# Patient Record
Sex: MALE | Race: WHITE | Employment: OTHER | URBAN - METROPOLITAN AREA
[De-identification: names, ages, dates, MRNs, and addresses within clinical notes are randomized per-mention and may not be internally consistent; named-entity substitution may affect disease eponyms.]

---

## 2017-07-28 PROBLEM — I73.9 PVD (PERIPHERAL VASCULAR DISEASE) (HCC): Chronic | Status: ACTIVE | Noted: 2017-07-28

## 2017-07-28 PROBLEM — Q24.6: Chronic | Status: ACTIVE | Noted: 2017-07-28

## 2017-07-28 PROBLEM — Z95.0 S/P CARDIAC PACEMAKER PROCEDURE: Status: ACTIVE | Noted: 2017-07-28

## 2018-12-17 ENCOUNTER — OFFICE VISIT (OUTPATIENT)
Dept: VASCULAR SURGERY | Age: 68
End: 2018-12-17
Payer: MEDICARE

## 2018-12-17 VITALS
HEIGHT: 70 IN | SYSTOLIC BLOOD PRESSURE: 136 MMHG | HEART RATE: 82 BPM | BODY MASS INDEX: 27.2 KG/M2 | DIASTOLIC BLOOD PRESSURE: 87 MMHG | WEIGHT: 190 LBS

## 2018-12-17 DIAGNOSIS — I73.9 PVD (PERIPHERAL VASCULAR DISEASE) (HCC): Primary | Chronic | ICD-10-CM

## 2018-12-17 PROCEDURE — 99213 OFFICE O/P EST LOW 20 MIN: CPT | Performed by: SURGERY

## 2018-12-17 PROCEDURE — 1123F ACP DISCUSS/DSCN MKR DOCD: CPT | Performed by: SURGERY

## 2018-12-17 PROCEDURE — G8419 CALC BMI OUT NRM PARAM NOF/U: HCPCS | Performed by: SURGERY

## 2018-12-17 PROCEDURE — 4040F PNEUMOC VAC/ADMIN/RCVD: CPT | Performed by: SURGERY

## 2018-12-17 PROCEDURE — 1101F PT FALLS ASSESS-DOCD LE1/YR: CPT | Performed by: SURGERY

## 2018-12-17 PROCEDURE — 3017F COLORECTAL CA SCREEN DOC REV: CPT | Performed by: SURGERY

## 2018-12-17 PROCEDURE — G8427 DOCREV CUR MEDS BY ELIG CLIN: HCPCS | Performed by: SURGERY

## 2018-12-17 PROCEDURE — 1036F TOBACCO NON-USER: CPT | Performed by: SURGERY

## 2018-12-17 PROCEDURE — G8484 FLU IMMUNIZE NO ADMIN: HCPCS | Performed by: SURGERY

## 2018-12-17 RX ORDER — CILOSTAZOL 100 MG/1
TABLET ORAL
Qty: 60 TABLET | Refills: 5 | Status: SHIPPED | OUTPATIENT
Start: 2018-12-17 | End: 2018-12-17 | Stop reason: SDUPTHER

## 2018-12-17 RX ORDER — GABAPENTIN 100 MG/1
200 CAPSULE ORAL 3 TIMES DAILY
Qty: 270 CAPSULE | Refills: 3 | Status: SHIPPED | OUTPATIENT
Start: 2018-12-17 | End: 2019-03-17

## 2018-12-17 RX ORDER — CILOSTAZOL 100 MG/1
TABLET ORAL
Qty: 180 TABLET | Refills: 5 | Status: SHIPPED
Start: 2018-12-17 | End: 2020-02-24

## 2018-12-17 NOTE — PROGRESS NOTES
known allergies. Social History     Social History    Marital status:      Spouse name: N/A    Number of children: N/A    Years of education: N/A     Occupational History    Not on file.      Social History Main Topics    Smoking status: Former Smoker     Packs/day: 0.75     Types: Cigarettes     Quit date: 1/30/2017    Smokeless tobacco: Never Used    Alcohol use Yes      Comment: every other day    Drug use: Unknown    Sexual activity: Not on file     Other Topics Concern    Not on file     Social History Narrative    No narrative on file     Family history  Denies history of PVD, AAA     Labs  Lab Results   Component Value Date    WBC 8.1 07/29/2017    HGB 12.3 (L) 07/29/2017    HCT 33.9 (L) 07/29/2017     07/29/2017    PROTIME 11.1 07/28/2017    INR 1.0 07/28/2017    APTT 31.7 12/01/2010    K 3.9 07/29/2017    BUN 12 07/29/2017    CREATININE 0.8 07/29/2017     PHYSICAL EXAM:    CONSTITUTIONAL:   Awake, alert, cooperative  PSYCHIATRIC :  Oriented to time, place and person     Appropriate insight to disease process  EYES: Lids and lashes normal  ENT:  External ears and nose without lesions   Hearing deficits not noted  NECK: Supple, symmetrical, trachea midline   Thyroid goiter not appreciated   Carotid bruit not noted  Left pacemaker  LUNGS:  No increased work of breathing                 Scattered rhonchi  CARDIOVASCULAR:  regular rate and rhythm   ABDOMEN:  soft, non-distended, non-tender   Hernias umbilical, non tender   Aorta is not palpable  SKIN:   Normal skin color   Texture and turgor normal, no induration  EXTREMITIES:   R LE Edema absent   No open wounds  L LE Edema absent    No open wounds  R dorsalis pedis biphasic L dorsalis pedis biphasic   R posterior tibial weakly biphasic L posterior tibial weakly biphasic     A/P Neuropathy - painful of lower extremity  · Pt is having worsening of symptoms   · Increase neurontin dose to 200 mg TID  · We discussed Neurontin and the

## 2019-02-22 ENCOUNTER — HOSPITAL ENCOUNTER (OUTPATIENT)
Age: 69
Discharge: HOME | End: 2019-02-22
Payer: MEDICARE

## 2019-02-22 LAB
ALBUMIN SERPL-MCNC: 4.1 G/DL (ref 3.5–5.2)
ALP BLD-CCNC: 72 U/L (ref 40–129)
ALT SERPL-CCNC: 38 U/L (ref 0–40)
AMYLASE: 82 U/L (ref 20–100)
ANION GAP SERPL CALCULATED.3IONS-SCNC: 11 MMOL/L (ref 7–16)
AST SERPL-CCNC: 41 U/L (ref 0–39)
BASOPHILS ABSOLUTE: 0.11 E9/L (ref 0–0.2)
BASOPHILS RELATIVE PERCENT: 1.1 % (ref 0–2)
BILIRUB SERPL-MCNC: 1.2 MG/DL (ref 0–1.2)
BUN BLDV-MCNC: 9 MG/DL (ref 8–23)
CALCIUM SERPL-MCNC: 8.9 MG/DL (ref 8.6–10.2)
CHLORIDE BLD-SCNC: 94 MMOL/L (ref 98–107)
CO2: 28 MMOL/L (ref 22–29)
CREAT SERPL-MCNC: 0.7 MG/DL (ref 0.7–1.2)
EOSINOPHILS ABSOLUTE: 0.37 E9/L (ref 0.05–0.5)
EOSINOPHILS RELATIVE PERCENT: 3.9 % (ref 0–6)
GFR AFRICAN AMERICAN: >60
GFR NON-AFRICAN AMERICAN: >60 ML/MIN/1.73
GLUCOSE BLD-MCNC: 123 MG/DL (ref 74–99)
HCT VFR BLD CALC: 43.6 % (ref 37–54)
HEMOGLOBIN: 15.5 G/DL (ref 12.5–16.5)
IMMATURE GRANULOCYTES #: 0.05 E9/L
IMMATURE GRANULOCYTES %: 0.5 % (ref 0–5)
LIPASE: 32 U/L (ref 13–60)
LYMPHOCYTES ABSOLUTE: 2.3 E9/L (ref 1.5–4)
LYMPHOCYTES RELATIVE PERCENT: 24 % (ref 20–42)
MCH RBC QN AUTO: 36 PG (ref 26–35)
MCHC RBC AUTO-ENTMCNC: 35.6 % (ref 32–34.5)
MCV RBC AUTO: 101.2 FL (ref 80–99.9)
MONOCYTES ABSOLUTE: 1.18 E9/L (ref 0.1–0.95)
MONOCYTES RELATIVE PERCENT: 12.3 % (ref 2–12)
NEUTROPHILS ABSOLUTE: 5.56 E9/L (ref 1.8–7.3)
NEUTROPHILS RELATIVE PERCENT: 58.2 % (ref 43–80)
PDW BLD-RTO: 11.7 FL (ref 11.5–15)
PLATELET # BLD: 147 E9/L (ref 130–450)
PMV BLD AUTO: 8.5 FL (ref 7–12)
POTASSIUM SERPL-SCNC: 4.6 MMOL/L (ref 3.5–5)
RBC # BLD: 4.31 E12/L (ref 3.8–5.8)
SODIUM BLD-SCNC: 133 MMOL/L (ref 132–146)
T3 FREE: 2.5 PG/ML (ref 2–4.4)
T4 TOTAL: 12 MCG/DL (ref 4.5–11.7)
TOTAL PROTEIN: 7.5 G/DL (ref 6.4–8.3)
TSH SERPL DL<=0.05 MIU/L-ACNC: 2.05 UIU/ML (ref 0.27–4.2)
WBC # BLD: 9.6 E9/L (ref 4.5–11.5)

## 2019-02-22 PROCEDURE — 84436 ASSAY OF TOTAL THYROXINE: CPT

## 2019-02-22 PROCEDURE — 36415 COLL VENOUS BLD VENIPUNCTURE: CPT

## 2019-02-22 PROCEDURE — 84443 ASSAY THYROID STIM HORMONE: CPT

## 2019-02-22 PROCEDURE — 85025 COMPLETE CBC W/AUTO DIFF WBC: CPT

## 2019-02-22 PROCEDURE — 82150 ASSAY OF AMYLASE: CPT

## 2019-02-22 PROCEDURE — 83690 ASSAY OF LIPASE: CPT

## 2019-02-22 PROCEDURE — 84481 FREE ASSAY (FT-3): CPT

## 2019-02-22 PROCEDURE — 80053 COMPREHEN METABOLIC PANEL: CPT

## 2020-02-25 RX ORDER — CILOSTAZOL 100 MG/1
TABLET ORAL
Qty: 180 TABLET | Refills: 0 | Status: SHIPPED | OUTPATIENT
Start: 2020-02-25

## 2022-10-26 ENCOUNTER — NURSE ONLY (OUTPATIENT)
Dept: NON INVASIVE DIAGNOSTICS | Age: 72
End: 2022-10-26

## 2023-02-07 ENCOUNTER — TELEPHONE (OUTPATIENT)
Dept: NON INVASIVE DIAGNOSTICS | Age: 73
End: 2023-02-07

## 2023-02-07 NOTE — TELEPHONE ENCOUNTER
----- Message from Gregor Roque RN sent at 2/7/2023  8:43 AM EST -----  Needs 3 month device clinic [facility]  Thanks

## 2023-05-25 ENCOUNTER — NURSE ONLY (OUTPATIENT)
Dept: NON INVASIVE DIAGNOSTICS | Age: 73
End: 2023-05-25
Payer: MEDICARE

## 2023-05-25 DIAGNOSIS — Z95.0 S/P CARDIAC PACEMAKER PROCEDURE: ICD-10-CM

## 2023-05-25 DIAGNOSIS — Q24.6 HEART BLOCK, CONGENITAL: Primary | Chronic | ICD-10-CM

## 2023-05-25 PROCEDURE — 93280 PM DEVICE PROGR EVAL DUAL: CPT | Performed by: INTERNAL MEDICINE

## 2023-12-14 ENCOUNTER — NURSE ONLY (OUTPATIENT)
Dept: NON INVASIVE DIAGNOSTICS | Age: 73
End: 2023-12-14

## 2023-12-14 DIAGNOSIS — Z95.0 CARDIAC PACEMAKER IN SITU: ICD-10-CM

## 2023-12-14 DIAGNOSIS — I44.2 COMPLETE HEART BLOCK (HCC): Primary | ICD-10-CM

## 2024-05-13 PROCEDURE — 93294 REM INTERROG EVL PM/LDLS PM: CPT | Performed by: INTERNAL MEDICINE

## 2024-05-13 PROCEDURE — 93296 REM INTERROG EVL PM/IDS: CPT | Performed by: INTERNAL MEDICINE

## 2024-05-29 ENCOUNTER — HOSPITAL ENCOUNTER (OUTPATIENT)
Dept: CARDIOLOGY | Age: 74
Discharge: HOME | End: 2024-05-31
Attending: INTERNAL MEDICINE
Payer: MEDICARE

## 2024-05-29 VITALS
HEIGHT: 70 IN | DIASTOLIC BLOOD PRESSURE: 60 MMHG | BODY MASS INDEX: 27.92 KG/M2 | SYSTOLIC BLOOD PRESSURE: 136 MMHG | WEIGHT: 195 LBS

## 2024-05-29 DIAGNOSIS — I44.2 COMPLETE HEART BLOCK (HCC): ICD-10-CM

## 2024-05-29 LAB
ECHO AV AREA PEAK VELOCITY: 3 CM2
ECHO AV AREA VTI: 3.6 CM2
ECHO AV AREA/BSA PEAK VELOCITY: 1.5 CM2/M2
ECHO AV AREA/BSA VTI: 1.7 CM2/M2
ECHO AV CUSP MM: 1.7 CM
ECHO AV MEAN GRADIENT: 3 MMHG
ECHO AV MEAN VELOCITY: 0.8 M/S
ECHO AV PEAK GRADIENT: 5 MMHG
ECHO AV PEAK VELOCITY: 1.2 M/S
ECHO AV VELOCITY RATIO: 0.75
ECHO AV VTI: 15.3 CM
ECHO BSA: 2.09 M2
ECHO EST RA PRESSURE: 3 MMHG
ECHO LA DIAMETER INDEX: 1.55 CM/M2
ECHO LA DIAMETER: 3.2 CM
ECHO LA VOL A-L A2C: 41 ML (ref 18–58)
ECHO LA VOL A-L A4C: 27 ML (ref 18–58)
ECHO LA VOL MOD A2C: 39 ML (ref 18–58)
ECHO LA VOL MOD A4C: 24 ML (ref 18–58)
ECHO LA VOLUME AREA LENGTH: 36 ML
ECHO LA VOLUME INDEX A-L A2C: 20 ML/M2 (ref 16–34)
ECHO LA VOLUME INDEX A-L A4C: 13 ML/M2 (ref 16–34)
ECHO LA VOLUME INDEX AREA LENGTH: 17 ML/M2 (ref 16–34)
ECHO LA VOLUME INDEX MOD A2C: 19 ML/M2 (ref 16–34)
ECHO LA VOLUME INDEX MOD A4C: 12 ML/M2 (ref 16–34)
ECHO LV FRACTIONAL SHORTENING: 30 % (ref 28–44)
ECHO LV INTERNAL DIMENSION DIASTOLE INDEX: 1.46 CM/M2
ECHO LV INTERNAL DIMENSION DIASTOLIC: 3 CM (ref 4.2–5.9)
ECHO LV INTERNAL DIMENSION SYSTOLIC INDEX: 1.02 CM/M2
ECHO LV INTERNAL DIMENSION SYSTOLIC: 2.1 CM
ECHO LV IVSD: 1.2 CM (ref 0.6–1)
ECHO LV MASS 2D: 109.1 G (ref 88–224)
ECHO LV MASS INDEX 2D: 53 G/M2 (ref 49–115)
ECHO LV POSTERIOR WALL DIASTOLIC: 1.2 CM (ref 0.6–1)
ECHO LV RELATIVE WALL THICKNESS RATIO: 0.8
ECHO LVOT AREA: 3.8 CM2
ECHO LVOT AV VTI INDEX: 0.98
ECHO LVOT DIAM: 2.2 CM
ECHO LVOT MEAN GRADIENT: 2 MMHG
ECHO LVOT PEAK GRADIENT: 4 MMHG
ECHO LVOT PEAK VELOCITY: 0.9 M/S
ECHO LVOT STROKE VOLUME INDEX: 27.7 ML/M2
ECHO LVOT SV: 57 ML
ECHO LVOT VTI: 15 CM
ECHO MV A VELOCITY: 0.72 M/S
ECHO MV AREA PHT: 3.8 CM2
ECHO MV AREA VTI: 3.4 CM2
ECHO MV E DECELERATION TIME (DT): 228.7 MS
ECHO MV E VELOCITY: 0.42 M/S
ECHO MV E/A RATIO: 0.58
ECHO MV LVOT VTI INDEX: 1.11
ECHO MV MAX VELOCITY: 0.9 M/S
ECHO MV MEAN GRADIENT: 1 MMHG
ECHO MV MEAN VELOCITY: 0.5 M/S
ECHO MV PEAK GRADIENT: 3 MMHG
ECHO MV PRESSURE HALF TIME (PHT): 57.8 MS
ECHO MV VTI: 16.7 CM
ECHO PV MAX VELOCITY: 0.8 M/S
ECHO PV MEAN GRADIENT: 1 MMHG
ECHO PV MEAN VELOCITY: 0.5 M/S
ECHO PV PEAK GRADIENT: 3 MMHG
ECHO PV VTI: 9.7 CM
ECHO RIGHT VENTRICULAR SYSTOLIC PRESSURE (RVSP): 26 MMHG
ECHO RV INTERNAL DIMENSION: 2.8 CM
ECHO TV REGURGITANT MAX VELOCITY: 2.41 M/S
ECHO TV REGURGITANT PEAK GRADIENT: 23 MMHG

## 2024-05-29 PROCEDURE — 93306 TTE W/DOPPLER COMPLETE: CPT | Performed by: INTERNAL MEDICINE

## 2024-05-29 PROCEDURE — 93306 TTE W/DOPPLER COMPLETE: CPT

## 2024-06-11 ENCOUNTER — OFFICE VISIT (OUTPATIENT)
Dept: NON INVASIVE DIAGNOSTICS | Age: 74
End: 2024-06-11

## 2024-06-11 ENCOUNTER — NURSE ONLY (OUTPATIENT)
Dept: NON INVASIVE DIAGNOSTICS | Age: 74
End: 2024-06-11

## 2024-06-11 VITALS
HEIGHT: 70 IN | WEIGHT: 190 LBS | SYSTOLIC BLOOD PRESSURE: 112 MMHG | DIASTOLIC BLOOD PRESSURE: 70 MMHG | HEART RATE: 78 BPM | BODY MASS INDEX: 27.2 KG/M2 | RESPIRATION RATE: 12 BRPM

## 2024-06-11 DIAGNOSIS — I44.2 COMPLETE HEART BLOCK (HCC): Primary | ICD-10-CM

## 2024-06-11 DIAGNOSIS — Q24.6 HEART BLOCK, CONGENITAL: Primary | Chronic | ICD-10-CM

## 2024-06-11 DIAGNOSIS — Z95.0 CARDIAC PACEMAKER IN SITU: ICD-10-CM

## 2024-06-11 NOTE — PROGRESS NOTES
V  100%    Battery longevity--4.5  months  Arrhythmias--nonsustained atrial tachycardia      Evaluation and reprogramming included   Overall device function is normal  All  device programmable settings were evaluated per above, along with iterative adjustments (capture thresholds) to assess and select the most appropriate final programming for consistent delivery off the appropriate therapy and to verify function of the device.    I have independently reviewed all of the ECGs and rhythm strips per above    I have personally reviewed the laboratory, cardiac diagnostic and radiographic testing as outlined above:        Impression:     1.  Dual-chamber pacemaker in situ--Brian, MELISSA 2017  Appropriate function but device generator is reaching ARIA    2.   Complete heart block--patient is pacemaker dependent  No recent cardiac workup however.  Last echo was in 2017    3.  Peripheral vascular disease--patient complains of intermittent claudication and is scheduled to be seen by vascular surgery in the near future    4.  Hypertension  Patient currently on carvedilol and losartan    5.  Ongoing smoking habit    Recommendations:    1.  Monthly remotes  2.  Device generator change when it reaches ARIA.  Patient will need a temporary pacemaker at the time of generator change  3.  Follow-up in device clinic check in 4 months if it has not reached ARIA  4.  No change in medical therapy  Risks, benefits, and alternatives of pacemaker generator replacement were discussed in detail today. These risks include but are not limited to bleeding,infection, lead damage or discovery of a non-functional lead which would require lead revision and risks of blood clot, pneumothorax, hemothorax, cardiac perforation and tamponade, lead dislodgement, contrast induced nephropathy leading to short or even long term dialysis, vascular injury requiring emergent surgical repair, stroke and even death. The patient understands these risks and agrees

## 2024-10-10 ENCOUNTER — NURSE ONLY (OUTPATIENT)
Dept: NON INVASIVE DIAGNOSTICS | Age: 74
End: 2024-10-10

## 2024-10-10 ENCOUNTER — TELEPHONE (OUTPATIENT)
Dept: NON INVASIVE DIAGNOSTICS | Age: 74
End: 2024-10-10

## 2024-10-10 ENCOUNTER — PREP FOR PROCEDURE (OUTPATIENT)
Dept: NON INVASIVE DIAGNOSTICS | Age: 74
End: 2024-10-10

## 2024-10-10 DIAGNOSIS — Z95.0 S/P CARDIAC PACEMAKER PROCEDURE: ICD-10-CM

## 2024-10-10 DIAGNOSIS — Q24.6 HEART BLOCK, CONGENITAL: Primary | Chronic | ICD-10-CM

## 2024-10-10 RX ORDER — SODIUM CHLORIDE 9 MG/ML
INJECTION, SOLUTION INTRAVENOUS PRN
Status: CANCELLED | OUTPATIENT
Start: 2024-10-31

## 2024-10-10 RX ORDER — SODIUM CHLORIDE 0.9 % (FLUSH) 0.9 %
5-40 SYRINGE (ML) INJECTION EVERY 12 HOURS SCHEDULED
Status: CANCELLED | OUTPATIENT
Start: 2024-10-31

## 2024-10-10 RX ORDER — SODIUM CHLORIDE 0.9 % (FLUSH) 0.9 %
5-40 SYRINGE (ML) INJECTION PRN
Status: CANCELLED | OUTPATIENT
Start: 2024-10-31

## 2024-10-10 NOTE — TELEPHONE ENCOUNTER
Patient is scheduled for a Dual Pacemaker Gen Change with temp pacing [facility] on 10/31/2024 with Dr. Myers.    Pre-op instructions given to patient at the time of his device check. Patient doesn't have to hold any medications for this procedure. Patient voiced understanding.    2 week wound check scheduled for 11/13/2024 at 10:00am in [facility].

## 2024-10-10 NOTE — TELEPHONE ENCOUNTER
Patient is scheduled for a Dual pacemaker Gen Change on 10/31/2024 with Dr. Myers.    CPT: 38710  ICD: Z45.010- Battery Depletion          I44.2- Complete Heart Block    INS: [employer] [place] [employer] id: [id number]

## 2024-10-30 ENCOUNTER — TELEPHONE (OUTPATIENT)
Age: 74
End: 2024-10-30

## 2024-10-31 ENCOUNTER — ANESTHESIA EVENT (OUTPATIENT)
Age: 74
End: 2024-10-31
Payer: MEDICARE

## 2024-10-31 ENCOUNTER — HOSPITAL ENCOUNTER (OUTPATIENT)
Age: 74
Setting detail: OUTPATIENT SURGERY
Discharge: HOME | End: 2024-10-31
Attending: INTERNAL MEDICINE | Admitting: INTERNAL MEDICINE
Payer: MEDICARE

## 2024-10-31 ENCOUNTER — ANESTHESIA (OUTPATIENT)
Age: 74
End: 2024-10-31
Payer: MEDICARE

## 2024-10-31 VITALS
WEIGHT: 190 LBS | OXYGEN SATURATION: 96 % | DIASTOLIC BLOOD PRESSURE: 94 MMHG | SYSTOLIC BLOOD PRESSURE: 160 MMHG | RESPIRATION RATE: 18 BRPM | HEART RATE: 72 BPM | TEMPERATURE: 98 F | HEIGHT: 70 IN | BODY MASS INDEX: 27.2 KG/M2

## 2024-10-31 DIAGNOSIS — Z45.010 ENCOUNTER FOR PACEMAKER AT END OF BATTERY LIFE: ICD-10-CM

## 2024-10-31 LAB
ALBUMIN SERPL-MCNC: 4.2 G/DL (ref 3.5–5.2)
ALP SERPL-CCNC: 101 U/L (ref 40–129)
ALT SERPL-CCNC: 31 U/L (ref 0–40)
ANION GAP SERPL CALCULATED.3IONS-SCNC: 7 MMOL/L (ref 7–16)
AST SERPL-CCNC: 41 U/L (ref 0–39)
BASOPHILS # BLD: 0.13 K/UL (ref 0–0.2)
BASOPHILS NFR BLD: 2 % (ref 0–2)
BILIRUB SERPL-MCNC: 1.4 MG/DL (ref 0–1.2)
BUN SERPL-MCNC: 12 MG/DL (ref 6–23)
CALCIUM SERPL-MCNC: 9.1 MG/DL (ref 8.6–10.2)
CHLORIDE SERPL-SCNC: 97 MMOL/L (ref 98–107)
CO2 SERPL-SCNC: 30 MMOL/L (ref 22–29)
CREAT SERPL-MCNC: 0.6 MG/DL (ref 0.7–1.2)
ECHO BSA: 2.06 M2
EOSINOPHIL # BLD: 0.65 K/UL (ref 0.05–0.5)
EOSINOPHILS RELATIVE PERCENT: 8 % (ref 0–6)
ERYTHROCYTE [DISTWIDTH] IN BLOOD BY AUTOMATED COUNT: 12.2 % (ref 11.5–15)
GFR, ESTIMATED: >90 ML/MIN/1.73M2
GLUCOSE SERPL-MCNC: 126 MG/DL (ref 74–99)
HCT VFR BLD AUTO: 41.6 % (ref 37–54)
HGB BLD-MCNC: 14.5 G/DL (ref 12.5–16.5)
IMM GRANULOCYTES # BLD AUTO: 0.07 K/UL (ref 0–0.58)
IMM GRANULOCYTES NFR BLD: 1 % (ref 0–5)
LYMPHOCYTES NFR BLD: 2.18 K/UL (ref 1.5–4)
LYMPHOCYTES RELATIVE PERCENT: 26 % (ref 20–42)
MAGNESIUM SERPL-MCNC: 1.9 MG/DL (ref 1.6–2.6)
MCH RBC QN AUTO: 35.8 PG (ref 26–35)
MCHC RBC AUTO-ENTMCNC: 34.9 G/DL (ref 32–34.5)
MCV RBC AUTO: 102.7 FL (ref 80–99.9)
MONOCYTES NFR BLD: 1.17 K/UL (ref 0.1–0.95)
MONOCYTES NFR BLD: 14 % (ref 2–12)
NEUTROPHILS NFR BLD: 49 % (ref 43–80)
NEUTS SEG NFR BLD: 4.05 K/UL (ref 1.8–7.3)
PLATELET # BLD AUTO: 118 K/UL (ref 130–450)
PMV BLD AUTO: 8.5 FL (ref 7–12)
POTASSIUM SERPL-SCNC: 4.9 MMOL/L (ref 3.5–5)
PROT SERPL-MCNC: 7.9 G/DL (ref 6.4–8.3)
RBC # BLD AUTO: 4.05 M/UL (ref 3.8–5.8)
SODIUM SERPL-SCNC: 134 MMOL/L (ref 132–146)
TSH SERPL DL<=0.05 MIU/L-ACNC: 2.41 UIU/ML (ref 0.27–4.2)
WBC OTHER # BLD: 8.3 K/UL (ref 4.5–11.5)

## 2024-10-31 PROCEDURE — 3700000001 HC ADD 15 MINUTES (ANESTHESIA): Performed by: INTERNAL MEDICINE

## 2024-10-31 PROCEDURE — 80053 COMPREHEN METABOLIC PANEL: CPT

## 2024-10-31 PROCEDURE — 33228 REMV&REPLC PM GEN DUAL LEAD: CPT | Performed by: INTERNAL MEDICINE

## 2024-10-31 PROCEDURE — 2580000003 HC RX 258

## 2024-10-31 PROCEDURE — C1889 IMPLANT/INSERT DEVICE, NOC: HCPCS | Performed by: INTERNAL MEDICINE

## 2024-10-31 PROCEDURE — 2580000003 HC RX 258: Performed by: NURSE ANESTHETIST, CERTIFIED REGISTERED

## 2024-10-31 PROCEDURE — 3700000000 HC ANESTHESIA ATTENDED CARE: Performed by: INTERNAL MEDICINE

## 2024-10-31 PROCEDURE — 83735 ASSAY OF MAGNESIUM: CPT

## 2024-10-31 PROCEDURE — 85025 COMPLETE CBC W/AUTO DIFF WBC: CPT

## 2024-10-31 PROCEDURE — 2720000010 HC SURG SUPPLY STERILE: Performed by: INTERNAL MEDICINE

## 2024-10-31 PROCEDURE — 84443 ASSAY THYROID STIM HORMONE: CPT

## 2024-10-31 PROCEDURE — C1785 PMKR, DUAL, RATE-RESP: HCPCS | Performed by: INTERNAL MEDICINE

## 2024-10-31 PROCEDURE — 2500000003 HC RX 250 WO HCPCS: Performed by: INTERNAL MEDICINE

## 2024-10-31 PROCEDURE — 6360000002 HC RX W HCPCS: Performed by: NURSE ANESTHETIST, CERTIFIED REGISTERED

## 2024-10-31 PROCEDURE — 7100000011 HC PHASE II RECOVERY - ADDTL 15 MIN: Performed by: INTERNAL MEDICINE

## 2024-10-31 PROCEDURE — C1894 INTRO/SHEATH, NON-LASER: HCPCS | Performed by: INTERNAL MEDICINE

## 2024-10-31 PROCEDURE — 7100000010 HC PHASE II RECOVERY - FIRST 15 MIN: Performed by: INTERNAL MEDICINE

## 2024-10-31 PROCEDURE — 2709999900 HC NON-CHARGEABLE SUPPLY: Performed by: INTERNAL MEDICINE

## 2024-10-31 DEVICE — DISPOSABLE SURGICAL CABLE
Type: IMPLANTABLE DEVICE | Status: NON-FUNCTIONAL
Brand: SJM™
Removed: 2024-10-31

## 2024-10-31 DEVICE — ENVELOPE CMRM6122 ABSORB MED MR
Type: IMPLANTABLE DEVICE | Site: CHEST  WALL | Status: FUNCTIONAL
Brand: TYRX™

## 2024-10-31 DEVICE — DR PACEMAKER DDDR
Type: IMPLANTABLE DEVICE | Site: CHEST  WALL | Status: FUNCTIONAL
Brand: ASSURITY MRI™

## 2024-10-31 RX ORDER — MIDAZOLAM HYDROCHLORIDE 1 MG/ML
INJECTION, SOLUTION INTRAMUSCULAR; INTRAVENOUS
Status: DISCONTINUED | OUTPATIENT
Start: 2024-10-31 | End: 2024-10-31 | Stop reason: SDUPTHER

## 2024-10-31 RX ORDER — SODIUM CHLORIDE 9 MG/ML
INJECTION, SOLUTION INTRAVENOUS
Status: DISCONTINUED | OUTPATIENT
Start: 2024-10-31 | End: 2024-10-31 | Stop reason: SDUPTHER

## 2024-10-31 RX ORDER — DOXYCYCLINE HYCLATE 100 MG
100 TABLET ORAL 2 TIMES DAILY
Qty: 14 TABLET | Refills: 0 | Status: SHIPPED | OUTPATIENT
Start: 2024-10-31 | End: 2024-11-07

## 2024-10-31 RX ORDER — PHENYLEPHRINE HYDROCHLORIDE 10 MG/ML
INJECTION INTRAVENOUS
Status: DISCONTINUED | OUTPATIENT
Start: 2024-10-31 | End: 2024-10-31 | Stop reason: SDUPTHER

## 2024-10-31 RX ORDER — LIDOCAINE HYDROCHLORIDE 10 MG/ML
INJECTION, SOLUTION INFILTRATION; PERINEURAL PRN
Status: DISCONTINUED | OUTPATIENT
Start: 2024-10-31 | End: 2024-10-31 | Stop reason: HOSPADM

## 2024-10-31 RX ORDER — CEFAZOLIN SODIUM 1 G/3ML
INJECTION, POWDER, FOR SOLUTION INTRAMUSCULAR; INTRAVENOUS
Status: DISCONTINUED | OUTPATIENT
Start: 2024-10-31 | End: 2024-10-31 | Stop reason: SDUPTHER

## 2024-10-31 RX ORDER — FENTANYL CITRATE 50 UG/ML
INJECTION, SOLUTION INTRAMUSCULAR; INTRAVENOUS
Status: DISCONTINUED | OUTPATIENT
Start: 2024-10-31 | End: 2024-10-31 | Stop reason: SDUPTHER

## 2024-10-31 RX ORDER — VANCOMYCIN HYDROCHLORIDE 1 G/20ML
INJECTION, POWDER, LYOPHILIZED, FOR SOLUTION INTRAVENOUS
Status: DISCONTINUED | OUTPATIENT
Start: 2024-10-31 | End: 2024-10-31 | Stop reason: SDUPTHER

## 2024-10-31 RX ORDER — SODIUM CHLORIDE 9 MG/ML
INJECTION, SOLUTION INTRAVENOUS PRN
Status: DISCONTINUED | OUTPATIENT
Start: 2024-10-31 | End: 2024-10-31 | Stop reason: HOSPADM

## 2024-10-31 RX ORDER — PROPOFOL 10 MG/ML
INJECTION, EMULSION INTRAVENOUS
Status: DISCONTINUED | OUTPATIENT
Start: 2024-10-31 | End: 2024-10-31 | Stop reason: SDUPTHER

## 2024-10-31 RX ORDER — SODIUM CHLORIDE 0.9 % (FLUSH) 0.9 %
5-40 SYRINGE (ML) INJECTION PRN
Status: DISCONTINUED | OUTPATIENT
Start: 2024-10-31 | End: 2024-10-31 | Stop reason: HOSPADM

## 2024-10-31 RX ORDER — SODIUM CHLORIDE 0.9 % (FLUSH) 0.9 %
5-40 SYRINGE (ML) INJECTION EVERY 12 HOURS SCHEDULED
Status: DISCONTINUED | OUTPATIENT
Start: 2024-10-31 | End: 2024-10-31 | Stop reason: HOSPADM

## 2024-10-31 RX ADMIN — MIDAZOLAM 2 MG: 1 INJECTION INTRAMUSCULAR; INTRAVENOUS at 12:27

## 2024-10-31 RX ADMIN — CEFAZOLIN 2 G: 1 INJECTION, POWDER, FOR SOLUTION INTRAMUSCULAR; INTRAVENOUS at 12:37

## 2024-10-31 RX ADMIN — VANCOMYCIN HYDROCHLORIDE 1000 MG: 1 INJECTION, POWDER, LYOPHILIZED, FOR SOLUTION INTRAVENOUS at 12:41

## 2024-10-31 RX ADMIN — PROPOFOL 30 MG: 10 INJECTION, EMULSION INTRAVENOUS at 12:35

## 2024-10-31 RX ADMIN — SODIUM CHLORIDE: 9 INJECTION, SOLUTION INTRAVENOUS at 12:27

## 2024-10-31 RX ADMIN — PHENYLEPHRINE HYDROCHLORIDE 200 MCG: 10 INJECTION, SOLUTION INTRAVENOUS at 12:58

## 2024-10-31 RX ADMIN — FENTANYL CITRATE 50 MCG: 50 INJECTION, SOLUTION INTRAMUSCULAR; INTRAVENOUS at 13:08

## 2024-10-31 RX ADMIN — PHENYLEPHRINE HYDROCHLORIDE 200 MCG: 10 INJECTION, SOLUTION INTRAVENOUS at 13:02

## 2024-10-31 RX ADMIN — PHENYLEPHRINE HYDROCHLORIDE 100 MCG: 10 INJECTION, SOLUTION INTRAVENOUS at 12:56

## 2024-10-31 RX ADMIN — PROPOFOL 50 MCG/KG/MIN: 10 INJECTION, EMULSION INTRAVENOUS at 12:36

## 2024-10-31 RX ADMIN — SODIUM CHLORIDE: 9 INJECTION, SOLUTION INTRAVENOUS at 12:41

## 2024-10-31 RX ADMIN — FENTANYL CITRATE 50 MCG: 50 INJECTION, SOLUTION INTRAMUSCULAR; INTRAVENOUS at 12:39

## 2024-10-31 RX ADMIN — PHENYLEPHRINE HYDROCHLORIDE 200 MCG: 10 INJECTION, SOLUTION INTRAVENOUS at 13:04

## 2024-10-31 RX ADMIN — FENTANYL CITRATE 50 MCG: 50 INJECTION, SOLUTION INTRAMUSCULAR; INTRAVENOUS at 12:35

## 2024-10-31 ASSESSMENT — PAIN - FUNCTIONAL ASSESSMENT: PAIN_FUNCTIONAL_ASSESSMENT: NONE - DENIES PAIN

## 2024-10-31 NOTE — PROGRESS NOTES
Left upper chest with Aquacel intact, site soft, no bleeding or hematoma. Right femoral vein site soft, no bleeding or hematoma, DSD and opsite intact. Pt assisted to bathroom x 1 assist.  Gait steady.

## 2024-10-31 NOTE — PROGRESS NOTES
Discharge instructions given to pt and family.  Both Verbalized an understanding. Questions answered.

## 2024-10-31 NOTE — DISCHARGE INSTRUCTIONS
[facility] Pacemaker Generator Change Patient Discharge Instructions      Medications:  Please resume your normal medication regimen as you are currently taking.  A prescription for an antibiotic has been sent to your pharmacy.    Follow-Up: You will be seen on Wednesday 11/13/24 at 10:30 am at the [facility]  for an incision check and brief pacemaker evaluation.      Incision Care:   You may have a white pressure dressing over your incision. If you do, remove it in 24 hours.  Aquacel dressing: Please leave the brown Aquacel dressing on for 7 days (under white pressure bandage if you have one). Remove the AquaCel dressing on Thursday 11/7/24.  Steri strips: They look like white strips of tape over your incision (under brown aquacel dressing). DO NOT REMOVE the steri strips, they will either fall off on their own or be removed at your follow up appointment.  Check the incision everyday: If you find any redness, warmth, swelling, drainage, fever greater than 100 degrees,  or chills notify the office immediately at [phone].  No lotions, powders, or creams to incision.    Bathing: You may shower starting tomorrow just make sure no water runs directly over the incision for 7 days (once the dressing has been removed). No submerging the incision in water (bath, swimming pool, hot tub..) until wound is completely healed.    Activity: You may continue regular daily activities tomorrow using caution for 1 week with the arm closest to your pacemaker. No extreme movements or stretching. No blows to the site or seatbelt/straps rubbing, cushion for your comfort.     ID Card: You will have a temporary ID card until a permanent card is sent to you by the device company. The permanent card will look like a ’s license or credit card and should arrive within 8 weeks. Carry your ID card with you at all times.     [facility]  [address] [facility]

## 2024-10-31 NOTE — ANESTHESIA PRE PROCEDURE
10:27 AM    GFRAA >60 02/22/2019 10:27 AM    LABGLOM >60 02/22/2019 10:27 AM    GLUCOSE 123 02/22/2019 10:27 AM    GLUCOSE 111 11/30/2011 11:15 AM    CALCIUM 8.9 02/22/2019 10:27 AM    BILITOT 1.2 02/22/2019 10:27 AM    ALKPHOS 72 02/22/2019 10:27 AM    AST 41 02/22/2019 10:27 AM    ALT 38 02/22/2019 10:27 AM       POC Tests: No results for input(s): \"POCGLU\", \"POCNA\", \"POCK\", \"POCCL\", \"POCBUN\", \"POCHEMO\", \"POCHCT\" in the last 72 hours.    Coags:   Lab Results   Component Value Date/Time    PROTIME 11.1 07/28/2017 09:50 AM    PROTIME 11.7 12/02/2010 05:15 AM    INR 1.0 07/28/2017 09:50 AM    APTT 31.7 12/01/2010 06:50 PM       HCG (If Applicable): No results found for: \"PREGTESTUR\", \"PREGSERUM\", \"HCG\", \"HCGQUANT\"     ABGs: No results found for: \"PHART\", \"PO2ART\", \"XDN0TPN\", \"SPO7CUB\", \"BEART\", \"T8BXXCQJ\"     Type & Screen (If Applicable):  No results found for: \"ABORH\", \"LABANTI\"    Drug/Infectious Status (If Applicable):  No results found for: \"HIV\", \"HEPCAB\"    COVID-19 Screening (If Applicable): No results found for: \"COVID19\"        Anesthesia Evaluation  Patient summary reviewed and Nursing notes reviewed   no history of anesthetic complications:   Airway: Mallampati: III  TM distance: <3 FB   Neck ROM: full  Mouth opening: > = 3 FB   Dental:      Comment: Overbite noted    Pulmonary:Negative Pulmonary ROS and normal exam  breath sounds clear to auscultation                             Cardiovascular:  Exercise tolerance: good (>4 METS)  (+) hypertension:, pacemaker (generator EOL): pacemaker      ECG reviewed  Rhythm: regular  Rate: normal      Cleared by cardiology              Neuro/Psych:   Negative Neuro/Psych ROS              GI/Hepatic/Renal: Neg GI/Hepatic/Renal ROS            Endo/Other: Negative Endo/Other ROS             Pt had no PAT visit       Abdominal: normal exam      Abdomen: soft.      Vascular: negative vascular ROS.         Other Findings:           Anesthesia Plan      MAC     ASA 3

## 2024-10-31 NOTE — H&P
Cardiac Electrophysiology   History and physical  Rafael Moreno  1950  Date of Service: 10/31/2024  Referring Provider/PCP: Drake Ely MD  Chief Complaint:   Patient here for pacemaker generator change        Patient Active Problem List    Diagnosis Date Noted    Encounter for pacemaker at end of battery life 10/31/2024    S/P cardiac pacemaker procedure 07/28/2017    Heart block, congenital 07/28/2017    PVD (peripheral vascular disease) ([facility]) 07/28/2017     PMH    1.  Congenital heart block.  2.  S/P pacemaker implantation on 7/13/00. The pacemaker is Pacesetter, model 5342.  3.  Echocardiogram on 7/13/00, which was completely normal except for mild concentric left ventricular hypertrophy.  4.  History of hypertension.  5.  Pacemaker generator change to [employer] RF SN [id number] on 12/2/10.  6.  Most recent pacemaker generator change was  in 2017    Current Facility-Administered Medications   Medication Dose Route Frequency Provider Last Rate Last Admin    sodium chloride flush 0.9 % injection 5-40 mL  5-40 mL IntraVENous 2 times per day Jarome, Tita        sodium chloride flush 0.9 % injection 5-40 mL  5-40 mL IntraVENous PRN Jarome, Tita        0.9 % sodium chloride infusion   IntraVENous PRN Jarome, Tita   Stopped at 10/31/24 1339         No Known Allergies    SUBJECTIVE: Rafael Moreno, 73-year-old patient with a history of congenital complete heart block and a dual-chamber pacemaker in situ since year 2000 presents to the office today for follow up.  He denies any new cardiac symptoms at the present time.  No recent hospitalizations.  He however has considerable symptoms related to peripheral vascular disease and is scheduled to be seen by Dr. MERCADO in the near future  Still smoking      6/11/24: Patient denies any new cardiac symptoms.  Still smokes few cigarettes every few days.  Has considerable symptoms related to peripheral vascular disease and is awaiting  reviewing records/tests, counseling/education of the patient, ordering medications/tests/procedures, coordinating care, and documenting clinical information in the EHR.   Thank you for allowing me to participate in your patient's care.    Annamaria Myers MD, [facility]  Cardiac Electrophysiology  [facility]  [facility]: [place]

## 2024-10-31 NOTE — ANESTHESIA POSTPROCEDURE EVALUATION
Department of Anesthesiology  Postprocedure Note    Patient: Rafael Moreno  MRN: [id number]  YOB: 1950  Date of evaluation: 10/31/2024    Procedure Summary       Date: 10/31/24 Room / [place] [facility]    Anesthesia Start: 1219 Anesthesia Stop: 1340    Procedure: Remove & replace PPM gen dual lead Diagnosis:       Encounter for pacemaker at end of battery life      (Encounter for pacemaker at end of battery life [Z45.010])    Providers: Annamaria Myers MD Responsible Provider: Amy Rodgers MD    Anesthesia Type: MAC ASA Status: 3            Anesthesia Type: No value filed.    Buddy Phase I: Buddy Score: 10    Buddy Phase II:      Anesthesia Post Evaluation    Patient location during evaluation: PACU  Patient participation: complete - patient participated  Level of consciousness: awake and alert  Airway patency: patent  Nausea & Vomiting: no nausea and no vomiting  Cardiovascular status: blood pressure returned to baseline and hemodynamically stable  Respiratory status: acceptable and spontaneous ventilation  Hydration status: euvolemic  Multimodal analgesia pain management approach  Pain management: adequate    No notable events documented.

## 2024-11-08 ENCOUNTER — TELEPHONE (OUTPATIENT)
Age: 74
End: 2024-11-08

## 2024-11-08 NOTE — TELEPHONE ENCOUNTER
I called Mr. Moreno  to see how he's doing since his PPM-GR he had on 10/31/24.  There was no answer.  I left a message reminding him to remove the aqaucel dressing today if he didn't already remove it yesterday , to leave the steri-strips intact, & to report any redness, bleeding, drainage, swelling, or fevers to Dr. Myers's office . I also reminded him of his follow up appt at the [facility] on Wednesday 11/13/24 at 10:30 am.  I left my number ([phone]) if he wishes to call me back.

## 2024-11-13 ENCOUNTER — NURSE ONLY (OUTPATIENT)
Dept: NON INVASIVE DIAGNOSTICS | Age: 74
End: 2024-11-13

## 2024-11-13 DIAGNOSIS — Q24.6 HEART BLOCK, CONGENITAL: Primary | ICD-10-CM

## 2024-11-13 DIAGNOSIS — Z95.0 CARDIAC PACEMAKER IN SITU: ICD-10-CM

## 2024-11-13 DIAGNOSIS — Z95.0 S/P CARDIAC PACEMAKER PROCEDURE: ICD-10-CM

## 2024-11-13 DIAGNOSIS — I44.2 COMPLETE HEART BLOCK (HCC): ICD-10-CM

## 2025-05-14 ENCOUNTER — CLINICAL SUPPORT (OUTPATIENT)
Dept: NON INVASIVE DIAGNOSTICS | Age: 75
End: 2025-05-14

## 2025-05-14 DIAGNOSIS — I44.2 COMPLETE HEART BLOCK (HCC): Primary | ICD-10-CM

## 2025-05-14 DIAGNOSIS — Z95.0 CARDIAC PACEMAKER IN SITU: ICD-10-CM

## (undated) DEVICE — CATHETER PACE 5FR L110CM 6FR INTRO D10CM 1MM SPACE POLYUR

## (undated) DEVICE — AGENT HEMOSTATIC SURGIFLOW MATRIX KIT W/THROMBIN

## (undated) DEVICE — CABLE PACE L12FT ALGTR CLP DISP FOR PACE SYS ANALZR MERLIN

## (undated) DEVICE — INTRODUCER SHTH 6FR L12CM DIA0.038IN HEMSTAS CLOSE TOL

## (undated) DEVICE — PLASMABLADE PS210-030S 3.0S LOCK: Brand: PLASMABLADE™

## (undated) DEVICE — PAD, DEFIB, ADULT, RADIOTRAN, PHYSIO, LO: Brand: MEDLINE